# Patient Record
Sex: FEMALE | URBAN - METROPOLITAN AREA
[De-identification: names, ages, dates, MRNs, and addresses within clinical notes are randomized per-mention and may not be internally consistent; named-entity substitution may affect disease eponyms.]

---

## 2024-03-13 ENCOUNTER — APPOINTMENT (OUTPATIENT)
Dept: URBAN - METROPOLITAN AREA CLINIC 254 | Age: 69
Setting detail: DERMATOLOGY
End: 2024-03-14

## 2024-03-13 VITALS — WEIGHT: 140 LBS | HEIGHT: 60 IN

## 2024-03-13 DIAGNOSIS — L40.0 PSORIASIS VULGARIS: ICD-10-CM

## 2024-03-13 PROBLEM — L30.9 DERMATITIS, UNSPECIFIED: Status: ACTIVE | Noted: 2024-03-13

## 2024-03-13 PROCEDURE — OTHER COUNSELING: OTHER

## 2024-03-13 PROCEDURE — OTHER MIPS QUALITY: OTHER

## 2024-03-13 PROCEDURE — 99204 OFFICE O/P NEW MOD 45 MIN: CPT

## 2024-03-13 PROCEDURE — OTHER PRESCRIPTION: OTHER

## 2024-03-13 PROCEDURE — OTHER PRESCRIPTION MEDICATION MANAGEMENT: OTHER

## 2024-03-13 RX ORDER — CLOBETASOL PROPIONATE 0.5 MG/ML
0.05% SOLUTION TOPICAL BID
Qty: 50 | Refills: 2 | Status: CANCELLED
Stop reason: CLARIF

## 2024-03-13 ASSESSMENT — LOCATION DETAILED DESCRIPTION DERM
LOCATION DETAILED: LEFT LATERAL FRONTAL SCALP
LOCATION DETAILED: LEFT INFERIOR PARIETAL SCALP
LOCATION DETAILED: RIGHT CENTRAL FRONTAL SCALP
LOCATION DETAILED: LEFT OCCIPITAL SCALP
LOCATION DETAILED: POSTERIOR MID-PARIETAL SCALP

## 2024-03-13 ASSESSMENT — LOCATION SIMPLE DESCRIPTION DERM
LOCATION SIMPLE: LEFT SCALP
LOCATION SIMPLE: POSTERIOR SCALP
LOCATION SIMPLE: SCALP
LOCATION SIMPLE: RIGHT SCALP

## 2024-03-13 ASSESSMENT — BSA PSORIASIS: % BODY COVERED IN PSORIASIS: 9

## 2024-03-13 ASSESSMENT — ITCH NUMERIC RATING SCALE: HOW SEVERE IS YOUR ITCHING?: 10

## 2024-03-13 ASSESSMENT — LOCATION ZONE DERM: LOCATION ZONE: SCALP

## 2024-03-13 NOTE — HPI: RASH (ATOPIC DERMATITIS)
How Severe Is Your Atopic Dermatitis?: moderate
Is This A New Presentation, Or A Follow-Up?: Rash
Additional History: Patient reports having also tried the Neutrogena Tar shampoo with no resolve/improvement.

## 2024-03-13 NOTE — PROCEDURE: PRESCRIPTION MEDICATION MANAGEMENT
Render In Strict Bullet Format?: No
Plan: Follow up in two months.
Detail Level: Zone
Initiate Treatment: -Clobetasol 0.05% solution BID to scalp\\n-OTC Nizoral Shampoo 2-3x weekly

## 2024-03-14 RX ORDER — CLOBETASOL PROPIONATE 0.5 MG/ML
SOLUTION TOPICAL BID
Qty: 50 | Refills: 2 | Status: ERX | COMMUNITY
Start: 2024-03-14